# Patient Record
Sex: MALE | Race: WHITE | NOT HISPANIC OR LATINO | URBAN - METROPOLITAN AREA
[De-identification: names, ages, dates, MRNs, and addresses within clinical notes are randomized per-mention and may not be internally consistent; named-entity substitution may affect disease eponyms.]

---

## 2017-02-03 ENCOUNTER — APPOINTMENT (RX ONLY)
Dept: URBAN - METROPOLITAN AREA CLINIC 304 | Facility: CLINIC | Age: 27
Setting detail: DERMATOLOGY
End: 2017-02-03

## 2017-02-03 DIAGNOSIS — L30.1 DYSHIDROSIS [POMPHOLYX]: ICD-10-CM

## 2017-02-03 PROCEDURE — ? COUNSELING

## 2017-02-03 PROCEDURE — 99201: CPT

## 2017-02-03 PROCEDURE — ? PRESCRIPTION

## 2017-02-03 RX ORDER — BETAMETHASONE DIPROPIONATE 0.5 MG/G
OINTMENT, AUGMENTED TOPICAL
Qty: 1 | Refills: 0 | Status: ERX | COMMUNITY
Start: 2017-02-03

## 2017-02-03 RX ADMIN — BETAMETHASONE DIPROPIONATE: 0.5 OINTMENT, AUGMENTED TOPICAL at 00:00

## 2017-02-03 ASSESSMENT — LOCATION SIMPLE DESCRIPTION DERM
LOCATION SIMPLE: LEFT RING FINGER
LOCATION SIMPLE: LEFT HAND
LOCATION SIMPLE: RIGHT HAND
LOCATION SIMPLE: LEFT MIDDLE FINGER
LOCATION SIMPLE: RIGHT MIDDLE FINGER

## 2017-02-03 ASSESSMENT — LOCATION DETAILED DESCRIPTION DERM
LOCATION DETAILED: RIGHT ULNAR PALM
LOCATION DETAILED: LEFT PROXIMAL PALMAR MIDDLE FINGER
LOCATION DETAILED: LEFT PROXIMAL PALMAR RING FINGER
LOCATION DETAILED: LEFT RADIAL PALM
LOCATION DETAILED: RIGHT PROXIMAL PALMAR MIDDLE FINGER
LOCATION DETAILED: RIGHT RADIAL PALM

## 2017-02-03 ASSESSMENT — LOCATION ZONE DERM
LOCATION ZONE: HAND
LOCATION ZONE: FINGER

## 2017-02-03 NOTE — HPI: DRY SKIN
How Severe Is Your Dry Skin?: moderate
Additional History: Has tried Nystatin and Triamcinolone, states did help but caused cracking to skin.

## 2017-03-03 ENCOUNTER — RX ONLY (OUTPATIENT)
Age: 27
Setting detail: RX ONLY
End: 2017-03-03

## 2017-03-03 RX ORDER — BETAMETHASONE DIPROPIONATE 0.5 MG/G
OINTMENT, AUGMENTED TOPICAL
Qty: 1 | Refills: 0 | Status: ERX

## 2020-05-21 ENCOUNTER — APPOINTMENT (RX ONLY)
Dept: URBAN - METROPOLITAN AREA CLINIC 304 | Facility: CLINIC | Age: 30
Setting detail: DERMATOLOGY
End: 2020-05-21

## 2020-05-21 DIAGNOSIS — D22 MELANOCYTIC NEVI: ICD-10-CM

## 2020-05-21 DIAGNOSIS — L81.4 OTHER MELANIN HYPERPIGMENTATION: ICD-10-CM

## 2020-05-21 DIAGNOSIS — L30.1 DYSHIDROSIS [POMPHOLYX]: ICD-10-CM

## 2020-05-21 DIAGNOSIS — D18.0 HEMANGIOMA: ICD-10-CM

## 2020-05-21 DIAGNOSIS — L82.1 OTHER SEBORRHEIC KERATOSIS: ICD-10-CM

## 2020-05-21 PROBLEM — D22.71 MELANOCYTIC NEVI OF RIGHT LOWER LIMB, INCLUDING HIP: Status: ACTIVE | Noted: 2020-05-21

## 2020-05-21 PROBLEM — D22.39 MELANOCYTIC NEVI OF OTHER PARTS OF FACE: Status: ACTIVE | Noted: 2020-05-21

## 2020-05-21 PROBLEM — D22.5 MELANOCYTIC NEVI OF TRUNK: Status: ACTIVE | Noted: 2020-05-21

## 2020-05-21 PROBLEM — D18.01 HEMANGIOMA OF SKIN AND SUBCUTANEOUS TISSUE: Status: ACTIVE | Noted: 2020-05-21

## 2020-05-21 PROCEDURE — 99214 OFFICE O/P EST MOD 30 MIN: CPT

## 2020-05-21 PROCEDURE — ? PRESCRIPTION

## 2020-05-21 PROCEDURE — ? FULL BODY SKIN EXAM

## 2020-05-21 PROCEDURE — ? COUNSELING

## 2020-05-21 PROCEDURE — ? SUNSCREEN RECOMMENDATIONS

## 2020-05-21 RX ORDER — CLOBETASOL PROPIONATE 0.5 MG/G
OINTMENT TOPICAL
Qty: 1 | Refills: 2 | Status: ERX | COMMUNITY
Start: 2020-05-21

## 2020-05-21 RX ADMIN — CLOBETASOL PROPIONATE: 0.5 OINTMENT TOPICAL at 00:00

## 2020-05-21 ASSESSMENT — LOCATION DETAILED DESCRIPTION DERM
LOCATION DETAILED: LEFT MEDIAL DORSAL FOOT
LOCATION DETAILED: RIGHT BUTTOCK
LOCATION DETAILED: RIGHT DISTAL DORSAL FOREARM
LOCATION DETAILED: RIGHT MID-UPPER BACK
LOCATION DETAILED: RIGHT PROXIMAL DORSAL FOREARM
LOCATION DETAILED: LEFT MEDIAL INFERIOR CHEST
LOCATION DETAILED: RIGHT INFERIOR CENTRAL MALAR CHEEK
LOCATION DETAILED: RIGHT DORSAL FOOT
LOCATION DETAILED: LEFT INFERIOR LATERAL MALAR CHEEK
LOCATION DETAILED: RIGHT INFERIOR UPPER BACK
LOCATION DETAILED: RIGHT LATERAL UPPER BACK
LOCATION DETAILED: LEFT DISTAL DORSAL FOREARM
LOCATION DETAILED: RIGHT SUPERIOR MEDIAL MIDBACK
LOCATION DETAILED: EPIGASTRIC SKIN
LOCATION DETAILED: RIGHT ANTERIOR PROXIMAL THIGH

## 2020-05-21 ASSESSMENT — LOCATION SIMPLE DESCRIPTION DERM
LOCATION SIMPLE: RIGHT FOOT
LOCATION SIMPLE: RIGHT THIGH
LOCATION SIMPLE: RIGHT UPPER BACK
LOCATION SIMPLE: RIGHT FOREARM
LOCATION SIMPLE: ABDOMEN
LOCATION SIMPLE: LEFT CHEEK
LOCATION SIMPLE: RIGHT CHEEK
LOCATION SIMPLE: CHEST
LOCATION SIMPLE: RIGHT LOWER BACK
LOCATION SIMPLE: LEFT FOOT
LOCATION SIMPLE: RIGHT BUTTOCK
LOCATION SIMPLE: LEFT FOREARM

## 2020-05-21 ASSESSMENT — LOCATION ZONE DERM
LOCATION ZONE: FEET
LOCATION ZONE: FACE
LOCATION ZONE: TRUNK
LOCATION ZONE: ARM
LOCATION ZONE: LEG

## 2022-01-24 ENCOUNTER — APPOINTMENT (RX ONLY)
Dept: URBAN - METROPOLITAN AREA CLINIC 304 | Facility: CLINIC | Age: 32
Setting detail: DERMATOLOGY
End: 2022-01-24

## 2022-01-24 DIAGNOSIS — L81.4 OTHER MELANIN HYPERPIGMENTATION: ICD-10-CM | Status: STABLE

## 2022-01-24 DIAGNOSIS — L30.1 DYSHIDROSIS [POMPHOLYX]: ICD-10-CM | Status: WELL CONTROLLED

## 2022-01-24 DIAGNOSIS — D22 MELANOCYTIC NEVI: ICD-10-CM | Status: STABLE

## 2022-01-24 DIAGNOSIS — K12.0 RECURRENT ORAL APHTHAE: ICD-10-CM | Status: WELL CONTROLLED

## 2022-01-24 DIAGNOSIS — Z41.9 ENCOUNTER FOR PROCEDURE FOR PURPOSES OTHER THAN REMEDYING HEALTH STATE, UNSPECIFIED: ICD-10-CM

## 2022-01-24 PROBLEM — D22.5 MELANOCYTIC NEVI OF TRUNK: Status: ACTIVE | Noted: 2022-01-24

## 2022-01-24 PROCEDURE — ? ADDITIONAL NOTES

## 2022-01-24 PROCEDURE — ? PRESCRIPTION MEDICATION MANAGEMENT

## 2022-01-24 PROCEDURE — ? FULL BODY SKIN EXAM

## 2022-01-24 PROCEDURE — ? SUNSCREEN RECOMMENDATIONS

## 2022-01-24 PROCEDURE — 99214 OFFICE O/P EST MOD 30 MIN: CPT

## 2022-01-24 PROCEDURE — ? TREATMENT REGIMEN

## 2022-01-24 PROCEDURE — ? COUNSELING

## 2022-01-24 ASSESSMENT — LOCATION ZONE DERM
LOCATION ZONE: TRUNK
LOCATION ZONE: FACE
LOCATION ZONE: FEET
LOCATION ZONE: HAND
LOCATION ZONE: MUCOUS_MEMBRANE

## 2022-01-24 ASSESSMENT — LOCATION SIMPLE DESCRIPTION DERM
LOCATION SIMPLE: RIGHT SUPERIOR GINGIVAE
LOCATION SIMPLE: ABDOMEN
LOCATION SIMPLE: RIGHT CHEEK
LOCATION SIMPLE: LEFT HAND
LOCATION SIMPLE: RIGHT PLANTAR SURFACE
LOCATION SIMPLE: LEFT PLANTAR SURFACE
LOCATION SIMPLE: RIGHT HAND

## 2022-01-24 ASSESSMENT — LOCATION DETAILED DESCRIPTION DERM
LOCATION DETAILED: EPIGASTRIC SKIN
LOCATION DETAILED: RIGHT RADIAL PALM
LOCATION DETAILED: LEFT RADIAL PALM
LOCATION DETAILED: RIGHT LATERAL ABDOMEN
LOCATION DETAILED: RIGHT CENTRAL MALAR CHEEK
LOCATION DETAILED: RIGHT SUPERIOR GINGIVAE
LOCATION DETAILED: LEFT PLANTAR FOREFOOT OVERLYING 2ND METATARSAL
LOCATION DETAILED: RIGHT PLANTAR FOREFOOT OVERLYING 2ND METATARSAL

## 2022-01-24 NOTE — PROCEDURE: PRESCRIPTION MEDICATION MANAGEMENT
Render In Strict Bullet Format?: No
Detail Level: Zone
Continue Regimen: Clobetasol TAA BID for up to 2 weeks per month prn flares

## 2022-01-24 NOTE — PROCEDURE: ADDITIONAL NOTES
Detail Level: Simple
Additional Notes: Patient says he gets a cream from his PCP that helps resolve the ulcers quickly. He would like a refill but can’t remember the name of the medication. Patient will call back with this information.
Render Risk Assessment In Note?: no

## 2022-01-24 NOTE — PROCEDURE: TREATMENT REGIMEN
Action 2: Continue
Show Skinmed[employer] Line: Yes
Detail Level: Zone
Start Regimen: Vitamin c serum daily followed by SPF 30+ qam \\nDiscussed differin QHS or obagi tretinoin 0.025% QHS

## 2022-10-10 NOTE — PROCEDURE: COUNSELING
Please mail letter to patient stating    Ronnie the liver ultrasound shows hepatic steady doses which means fatty liver disease-they did not mention any thing with regards to a fibrotic or cirrhotic appearance--I do normally refer patients to gastroenterology with regards to fatty liver disease as over time it could go into a fibrotic liver and then over time it can change into a cirrhotic liver--please let me know if you would like for me to proceed with this referral and I will be glad to place that
Detail Level: Generalized
Detail Level: Detailed
Detail Level: Zone

## 2025-03-20 ENCOUNTER — APPOINTMENT (OUTPATIENT)
Dept: URBAN - METROPOLITAN AREA CLINIC 304 | Facility: CLINIC | Age: 35
Setting detail: DERMATOLOGY
End: 2025-03-20

## 2025-03-20 DIAGNOSIS — D22 MELANOCYTIC NEVI: ICD-10-CM | Status: STABLE

## 2025-03-20 DIAGNOSIS — L84 CORNS AND CALLOSITIES: ICD-10-CM | Status: INADEQUATELY CONTROLLED

## 2025-03-20 DIAGNOSIS — L81.4 OTHER MELANIN HYPERPIGMENTATION: ICD-10-CM | Status: STABLE

## 2025-03-20 DIAGNOSIS — D18.0 HEMANGIOMA: ICD-10-CM | Status: STABLE

## 2025-03-20 DIAGNOSIS — L82.1 OTHER SEBORRHEIC KERATOSIS: ICD-10-CM | Status: STABLE

## 2025-03-20 PROBLEM — D18.01 HEMANGIOMA OF SKIN AND SUBCUTANEOUS TISSUE: Status: ACTIVE | Noted: 2025-03-20

## 2025-03-20 PROBLEM — D22.5 MELANOCYTIC NEVI OF TRUNK: Status: ACTIVE | Noted: 2025-03-20

## 2025-03-20 PROCEDURE — ? LIQUID NITROGEN

## 2025-03-20 PROCEDURE — 99203 OFFICE O/P NEW LOW 30 MIN: CPT | Mod: 25

## 2025-03-20 PROCEDURE — ? FULL BODY SKIN EXAM

## 2025-03-20 PROCEDURE — ? TREATMENT REGIMEN

## 2025-03-20 PROCEDURE — 17110 DESTRUCTION B9 LES UP TO 14: CPT

## 2025-03-20 PROCEDURE — ? COUNSELING

## 2025-03-20 ASSESSMENT — LOCATION DETAILED DESCRIPTION DERM
LOCATION DETAILED: LEFT LATERAL 4TH TOE
LOCATION DETAILED: RIGHT INFERIOR UPPER BACK
LOCATION DETAILED: RIGHT MID-UPPER BACK
LOCATION DETAILED: RIGHT LATERAL ABDOMEN
LOCATION DETAILED: EPIGASTRIC SKIN

## 2025-03-20 ASSESSMENT — LOCATION ZONE DERM
LOCATION ZONE: TRUNK
LOCATION ZONE: TOE

## 2025-03-20 ASSESSMENT — LOCATION SIMPLE DESCRIPTION DERM
LOCATION SIMPLE: ABDOMEN
LOCATION SIMPLE: LEFT 4TH TOE
LOCATION SIMPLE: RIGHT UPPER BACK

## 2025-03-20 NOTE — HPI: EVALUATION OF SKIN LESION(S)
What Type Of Note Output Would You Prefer (Optional)?: Standard Output
Hpi Title: Evaluation of Skin Lesions
Additional History: Patient has a painful lesion on the left foot he would like evaluated today.

## 2025-03-20 NOTE — PROCEDURE: LIQUID NITROGEN
Medical Necessity Information: It is in your best interest to select a reason for this procedure from the list below. All of these items fulfill various CMS LCD requirements except the new and changing color options.
Duration Of Freeze Thaw-Cycle (Seconds): 3
Show Applicator Variable?: Yes
Post-Care Instructions: I reviewed with the patient in detail post-care instructions. Patient is to wear sunprotection, and avoid picking at any of the treated lesions. Pt may apply Vaseline to crusted or scabbing areas.
Detail Level: Simple
Medical Necessity Clause: This procedure was medically necessary because the lesions that were treated were:
Spray Paint Technique: No
Number Of Freeze-Thaw Cycles: 1 freeze-thaw cycle
Consent: The patient's consent was obtained including but not limited to risks of crusting, scabbing, blistering, scarring, darker or lighter pigmentary change, recurrence, incomplete removal and infection.
Spray Paint Text: The liquid nitrogen was applied to the skin utilizing a spray paint frosting technique.

## 2025-03-20 NOTE — PROCEDURE: COUNSELING
Detail Level: Generalized
Detail Level: Zone
left hand wound
Patient Specific Counseling (Will Not Stick From Patient To Patient): Recommended using a donut pad in between the toes after the freezing
Detail Level: Detailed